# Patient Record
(demographics unavailable — no encounter records)

---

## 2024-12-05 NOTE — PHYSICAL EXAM
[JVD] : no jugular venous distention  [Respiratory Effort] : normal respiratory effort [Normal Rate and Rhythm] : normal rate and rhythm [Ankle Swelling (On Exam)] : not present [de-identified] : awake, alert [FreeTextEntry1] : feet warm + skin hyper/hypopigmentation

## 2024-12-05 NOTE — ASSESSMENT
[FreeTextEntry1] : chronic dvt, w webbing, narrowed lumen.   given parital recannalization, rec continue full AC, eliquis 5 mg bid  pt should return in 6 mo for repeat surveillance, hopefully can decrease to prophylactic, or stop AC all together

## 2024-12-05 NOTE — END OF VISIT
[Time Spent: ___ minutes] : I have spent [unfilled] minutes of time on the encounter which excludes teaching and separately reported services. 7.5 mm

## 2024-12-05 NOTE — HISTORY OF PRESENT ILLNESS
[FreeTextEntry1] : 85M, hx LLE DVT. Pt was diagnosed w dvt in apr 2024, has been on AC.  he also went to the ED in sept, showing subacute/chronic dvt.  Pt reports no pain, swelling, but does state he has night time cramping.  hx cva, parkinsons, bph